# Patient Record
Sex: MALE | Race: WHITE | URBAN - METROPOLITAN AREA
[De-identification: names, ages, dates, MRNs, and addresses within clinical notes are randomized per-mention and may not be internally consistent; named-entity substitution may affect disease eponyms.]

---

## 2017-04-30 ENCOUNTER — HOSPITAL ENCOUNTER (INPATIENT)
Facility: CLINIC | Age: 57
LOS: 3 days | Discharge: HOME | DRG: 897 | End: 2017-05-03
Attending: FAMILY MEDICINE | Admitting: PSYCHIATRY & NEUROLOGY
Payer: COMMERCIAL

## 2017-04-30 DIAGNOSIS — F10.20 ALCOHOL USE DISORDER, SEVERE, DEPENDENCE (H): Primary | Chronic | ICD-10-CM

## 2017-04-30 DIAGNOSIS — F10.229 ALCOHOL DEPENDENCE WITH INTOXICATION WITH COMPLICATION (H): ICD-10-CM

## 2017-04-30 PROBLEM — F10.939 ALCOHOL WITHDRAWAL (H): Status: ACTIVE | Noted: 2017-04-30

## 2017-04-30 LAB
ALBUMIN SERPL-MCNC: 4.1 G/DL (ref 3.4–5)
ALCOHOL BREATH TEST: 0.22 (ref 0–0.01)
ALCOHOL BREATH TEST: 0.33 (ref 0–0.01)
ALP SERPL-CCNC: 58 U/L (ref 40–150)
ALT SERPL W P-5'-P-CCNC: 135 U/L (ref 0–70)
AMPHETAMINES UR QL SCN: ABNORMAL
ANION GAP SERPL CALCULATED.3IONS-SCNC: 10 MMOL/L (ref 3–14)
AST SERPL W P-5'-P-CCNC: 135 U/L (ref 0–45)
BARBITURATES UR QL: ABNORMAL
BASOPHILS # BLD AUTO: 0.1 10E9/L (ref 0–0.2)
BASOPHILS NFR BLD AUTO: 0.6 %
BENZODIAZ UR QL: ABNORMAL
BILIRUB SERPL-MCNC: 0.5 MG/DL (ref 0.2–1.3)
BUN SERPL-MCNC: 8 MG/DL (ref 7–30)
CALCIUM SERPL-MCNC: 8.9 MG/DL (ref 8.5–10.1)
CANNABINOIDS UR QL SCN: ABNORMAL
CHLORIDE SERPL-SCNC: 101 MMOL/L (ref 94–109)
CO2 SERPL-SCNC: 29 MMOL/L (ref 20–32)
COCAINE UR QL: ABNORMAL
CREAT SERPL-MCNC: 0.72 MG/DL (ref 0.66–1.25)
DIFFERENTIAL METHOD BLD: ABNORMAL
EOSINOPHIL # BLD AUTO: 0.1 10E9/L (ref 0–0.7)
EOSINOPHIL NFR BLD AUTO: 0.6 %
ERYTHROCYTE [DISTWIDTH] IN BLOOD BY AUTOMATED COUNT: 12.7 % (ref 10–15)
ETHANOL SERPL-MCNC: 0.33 G/DL
ETHANOL UR QL SCN: ABNORMAL
GFR SERPL CREATININE-BSD FRML MDRD: ABNORMAL ML/MIN/1.7M2
GGT SERPL-CCNC: 88 U/L (ref 0–75)
GLUCOSE SERPL-MCNC: 98 MG/DL (ref 70–99)
HCT VFR BLD AUTO: 52.7 % (ref 40–53)
HGB BLD-MCNC: 19.1 G/DL (ref 13.3–17.7)
IMM GRANULOCYTES # BLD: 0 10E9/L (ref 0–0.4)
IMM GRANULOCYTES NFR BLD: 0.3 %
LIPASE SERPL-CCNC: 303 U/L (ref 73–393)
LYMPHOCYTES # BLD AUTO: 3.3 10E9/L (ref 0.8–5.3)
LYMPHOCYTES NFR BLD AUTO: 36.1 %
MCH RBC QN AUTO: 33.7 PG (ref 26.5–33)
MCHC RBC AUTO-ENTMCNC: 36.2 G/DL (ref 31.5–36.5)
MCV RBC AUTO: 93 FL (ref 78–100)
MONOCYTES # BLD AUTO: 0.9 10E9/L (ref 0–1.3)
MONOCYTES NFR BLD AUTO: 10.3 %
NEUTROPHILS # BLD AUTO: 4.7 10E9/L (ref 1.6–8.3)
NEUTROPHILS NFR BLD AUTO: 52.1 %
NRBC # BLD AUTO: 0 10*3/UL
NRBC BLD AUTO-RTO: 0 /100
OPIATES UR QL SCN: ABNORMAL
PLATELET # BLD AUTO: 219 10E9/L (ref 150–450)
POTASSIUM SERPL-SCNC: 3.8 MMOL/L (ref 3.4–5.3)
PROT SERPL-MCNC: 8.1 G/DL (ref 6.8–8.8)
RBC # BLD AUTO: 5.67 10E12/L (ref 4.4–5.9)
SODIUM SERPL-SCNC: 140 MMOL/L (ref 133–144)
WBC # BLD AUTO: 9 10E9/L (ref 4–11)

## 2017-04-30 PROCEDURE — 36415 COLL VENOUS BLD VENIPUNCTURE: CPT

## 2017-04-30 PROCEDURE — 99285 EMERGENCY DEPT VISIT HI MDM: CPT

## 2017-04-30 PROCEDURE — 80320 DRUG SCREEN QUANTALCOHOLS: CPT | Performed by: EMERGENCY MEDICINE

## 2017-04-30 PROCEDURE — 12800008 ZZH R&B CD ADULT

## 2017-04-30 PROCEDURE — 82977 ASSAY OF GGT: CPT | Performed by: FAMILY MEDICINE

## 2017-04-30 PROCEDURE — 85025 COMPLETE CBC W/AUTO DIFF WBC: CPT | Performed by: FAMILY MEDICINE

## 2017-04-30 PROCEDURE — 99285 EMERGENCY DEPT VISIT HI MDM: CPT | Mod: Z6 | Performed by: FAMILY MEDICINE

## 2017-04-30 PROCEDURE — 82075 ASSAY OF BREATH ETHANOL: CPT | Mod: 91

## 2017-04-30 PROCEDURE — 80320 DRUG SCREEN QUANTALCOHOLS: CPT | Performed by: FAMILY MEDICINE

## 2017-04-30 PROCEDURE — HZ2ZZZZ DETOXIFICATION SERVICES FOR SUBSTANCE ABUSE TREATMENT: ICD-10-PCS | Performed by: PSYCHIATRY & NEUROLOGY

## 2017-04-30 PROCEDURE — 82075 ASSAY OF BREATH ETHANOL: CPT | Performed by: FAMILY MEDICINE

## 2017-04-30 PROCEDURE — 83690 ASSAY OF LIPASE: CPT | Performed by: FAMILY MEDICINE

## 2017-04-30 PROCEDURE — 25000132 ZZH RX MED GY IP 250 OP 250 PS 637: Performed by: FAMILY MEDICINE

## 2017-04-30 PROCEDURE — 82075 ASSAY OF BREATH ETHANOL: CPT | Mod: 91 | Performed by: FAMILY MEDICINE

## 2017-04-30 PROCEDURE — 80053 COMPREHEN METABOLIC PANEL: CPT | Performed by: FAMILY MEDICINE

## 2017-04-30 PROCEDURE — 82075 ASSAY OF BREATH ETHANOL: CPT

## 2017-04-30 PROCEDURE — 80307 DRUG TEST PRSMV CHEM ANLYZR: CPT | Performed by: EMERGENCY MEDICINE

## 2017-04-30 PROCEDURE — 25000132 ZZH RX MED GY IP 250 OP 250 PS 637: Performed by: PSYCHIATRY & NEUROLOGY

## 2017-04-30 RX ORDER — HYDROXYZINE HYDROCHLORIDE 25 MG/1
25-50 TABLET, FILM COATED ORAL EVERY 4 HOURS PRN
Status: DISCONTINUED | OUTPATIENT
Start: 2017-04-30 | End: 2017-05-03 | Stop reason: HOSPADM

## 2017-04-30 RX ORDER — DIAZEPAM 5 MG
5-20 TABLET ORAL EVERY 30 MIN PRN
Status: DISCONTINUED | OUTPATIENT
Start: 2017-04-30 | End: 2017-05-03 | Stop reason: HOSPADM

## 2017-04-30 RX ORDER — LISINOPRIL 20 MG/1
20 TABLET ORAL DAILY
Status: DISCONTINUED | OUTPATIENT
Start: 2017-05-01 | End: 2017-05-03 | Stop reason: HOSPADM

## 2017-04-30 RX ORDER — DIAZEPAM 5 MG
5 TABLET ORAL ONCE
Status: COMPLETED | OUTPATIENT
Start: 2017-04-30 | End: 2017-04-30

## 2017-04-30 RX ORDER — ALUMINA, MAGNESIA, AND SIMETHICONE 2400; 2400; 240 MG/30ML; MG/30ML; MG/30ML
30 SUSPENSION ORAL EVERY 4 HOURS PRN
Status: DISCONTINUED | OUTPATIENT
Start: 2017-04-30 | End: 2017-05-03 | Stop reason: HOSPADM

## 2017-04-30 RX ORDER — OMEGA-3-ACID ETHYL ESTERS 1 G/1
1 CAPSULE, LIQUID FILLED ORAL DAILY
COMMUNITY

## 2017-04-30 RX ORDER — LANOLIN ALCOHOL/MO/W.PET/CERES
100 CREAM (GRAM) TOPICAL DAILY
Status: DISCONTINUED | OUTPATIENT
Start: 2017-05-01 | End: 2017-05-03 | Stop reason: HOSPADM

## 2017-04-30 RX ORDER — MULTIPLE VITAMINS W/ MINERALS TAB 9MG-400MCG
1 TAB ORAL DAILY
Status: DISCONTINUED | OUTPATIENT
Start: 2017-05-01 | End: 2017-05-03 | Stop reason: HOSPADM

## 2017-04-30 RX ORDER — ACETAMINOPHEN 325 MG/1
650 TABLET ORAL EVERY 4 HOURS PRN
Status: DISCONTINUED | OUTPATIENT
Start: 2017-04-30 | End: 2017-05-03 | Stop reason: HOSPADM

## 2017-04-30 RX ORDER — OMEGA-3-ACID ETHYL ESTERS 1 G/1
1 CAPSULE, LIQUID FILLED ORAL DAILY
Status: DISCONTINUED | OUTPATIENT
Start: 2017-05-01 | End: 2017-05-03 | Stop reason: HOSPADM

## 2017-04-30 RX ORDER — NICOTINE 21 MG/24HR
1 PATCH, TRANSDERMAL 24 HOURS TRANSDERMAL DAILY
Status: DISCONTINUED | OUTPATIENT
Start: 2017-04-30 | End: 2017-05-03 | Stop reason: HOSPADM

## 2017-04-30 RX ORDER — BISACODYL 10 MG
10 SUPPOSITORY, RECTAL RECTAL DAILY PRN
Status: DISCONTINUED | OUTPATIENT
Start: 2017-04-30 | End: 2017-05-03 | Stop reason: HOSPADM

## 2017-04-30 RX ORDER — FOLIC ACID 1 MG/1
1 TABLET ORAL DAILY
Status: DISCONTINUED | OUTPATIENT
Start: 2017-05-01 | End: 2017-05-03 | Stop reason: HOSPADM

## 2017-04-30 RX ADMIN — DIAZEPAM 5 MG: 5 TABLET ORAL at 19:35

## 2017-04-30 RX ADMIN — DIAZEPAM 10 MG: 5 TABLET ORAL at 22:22

## 2017-04-30 ASSESSMENT — ACTIVITIES OF DAILY LIVING (ADL)
GROOMING: INDEPENDENT
ORAL_HYGIENE: INDEPENDENT
DRESS: INDEPENDENT

## 2017-04-30 NOTE — IP AVS SNAPSHOT
[facility]    [address] [place] [id number]    Phone:  [phone]                                       After Visit Summary   4/30/2017    Deric Greenwood    MRN: [id number]           After Visit Summary Signature Page     I have received my discharge instructions, and my questions have been answered. I have discussed any challenges I see with this plan with the nurse or doctor.    ..........................................................................................................................................  Patient/Patient Representative Signature      ..........................................................................................................................................  Patient Representative Print Name and Relationship to Patient    ..................................................               ................................................  Date                                            Time    ..........................................................................................................................................  Reviewed by Signature/Title    ...................................................              ..............................................  Date                                                            Time

## 2017-04-30 NOTE — IP AVS SNAPSHOT
MRN:[id number]                      After Visit Summary   4/30/2017    Deric Greenwood    MRN: [id number]           Thank you!     Thank you for choosing [facility] for your care. Our goal is always to provide you with excellent care.        Patient Information     Date Of Birth          1960        Designated Caregiver       Most Recent Value    Caregiver    Will someone help with your care after discharge? yes    Name of designated caregiver sister Nguyen    Phone number of caregiver in cell phone    Caregiver address na      About your hospital stay     You were admitted on:  April 30, 2017 You last received care in the: [facility]    You were discharged on:  May 3, 2017       Who to Call     For medical emergencies, please call 911.  For non-urgent questions about your medical care, please call your primary care provider or clinic, None          Attending Provider     Provider Ozzie Mcgill MD Emergency Medicine    Megha Nunes MD Pediatrics       Primary Care Provider    Physician No Ref-Primary       No address on file        Further instructions from your care team       Behavioral Discharge Planning and Instructions  THANK YOU FOR CHOOSING THE [address]  [phone]    Summary: You were admitted to [facility] on 4/30/17 for treatment and evaluation of  Alcohol use.  A medical exam was performed that included lab work. You have met with a  and opted to continue your treatment with [facility] treatment program in [place].  You are scheduled for an intake with them on May 15, 2017 @ noon.  Please take care and make your recovery a priority, Deric!     [facility] [place]  Evening Outpatient Treatment  [address]  [address]  [place] [address]  Phone: [phone]    Main Diagnosis: Per Dr. Elvia Nunes MD;  Principal Problem:  Alcohol  dependence with uncomplicated withdrawal (H), still in active detoxification status  Active Problems:  Alcohol use disorder, severe, dependence (H), initiate naltrexone tomorrow if patient still approves  Hypertension, on medication, stable       Major Treatments, Procedures and Findings:  You were detoxed from alcohol with the Modified Selective Severity Protocol using Valium. You have met with a  to develop a treatment plan for discharge.  You have had labs drawn and a copy of those labs will be sent home with you.  Please bring your lab results with to your follow up doctor appointment.    Symptoms to Report:  If you experience more anxiety, confusion, sleeplessness, deep sadness or thoughts of suicide, notify your treatment team or notify your primary care physician. IF ANY OF THE SYMPTOMS YOU ARE EXPERIENCING ARE A MEDICAL EMERGENCY CALL 911 IMMEDIATELY.     Lifestyle Adjustment: Health Action Plan:  1.Create a daily schedule  2. Eat Healthy  3. Plan Enjoyable Sober Activities  4. Use Problem Solving Skills and Deal with Issues as they Arise.   5. Be Physically Active  6. Take your medications as prescribed  7. Get enough restful sleep  8. Practice Relaxation  9. Spend time with Supportive People  10. No use of alcohol, illegal drugs or addictive medications other than what is currently prescribed.   11.AA, NA Sponsor are excellent resources for support    Keeping hands clean is one of the most important steps we can take to avoid getting sick and spreading germs to others.  Please wash your hands frequently.     Primary Provider:  Dr. Anastasia Jett  May 26, 2017 @ 8:00 am  [phone]  Please take this discharge folder with you to all your follow up appointments as it contains your lab results, diagnosis, medication list and discharge recommendations.         General Medication Instructions:   See your medication sheet(s) for instructions.   Take all medicines as directed.  Make no changes  "unless your doctor suggests them.   Go to all your doctor visits.  Be sure to have all your required lab tests. This way, your medicines can be refilled on time.  Do not use any drugs not prescribed by your provider.  AA/NA and Sponsors are excellent resources for support  Avoid alcohol.    Any follow up concerns:  Nursing questions call the [facility] [phone]  Medical Record call [phone]  Outpatient Behavioral Intake call [phone]  LP+ Wait List/Bed Availability call [phone] ([place])    The entire treatment team has appreciated the opportunity to work with you Deric.  We wish you the best in the future and with your lifelong recovery goals. Please bring this discharge folder with you to all follow up appointments.  It contains your lab results, diagnosis, medication list and discharge recommendations.    THANK YOU FOR CHOOSING THE [facility]     The treatment team has appreciated the opportunity to work with you.  We wish you the best in the future.      Pending Results     No orders found from 4/28/2017 to 5/1/2017.            Admission Information     Date & Time Provider Department Dept. Phone    4/30/2017 Megha Nunes MD [facility] [phone]      Your Vitals Were     Blood Pressure Pulse Temperature Respirations Weight Pulse Oximetry    150/97 (BP Location: Left arm) 93 98.2  F (36.8  C) (Oral) 16 90.7 kg (200 lb) 98%    BMI (Body Mass Index)                   26.39 kg/m2           MyChart Information     [employer] lets you send messages to your doctor, view your test results, renew your prescriptions, schedule appointments and more. To sign up, go to [email or web address] . Click on \"Log in\" on the left side of the screen, which will take you to the Welcome page. Then click on \"Sign up Now\" on the right side of the page.     You will be asked to enter the access code listed below, as well as some personal " information. Please follow the directions to create your username and password.     Your access code is: [email or web address]  Expires: 2017 11:35 AM     Your access code will  in 90 days. If you need help or a new code, please call your [facility] clinic or [phone].        Care EveryWhere ID     This is your Care EveryWhere ID. This could be used by other organizations to access your [facility] medical records  [id number]           Review of your medicines      START taking        Dose / Directions    naltrexone 50 MG tablet   Commonly known as:  DEPADE;REVIA   Used for:  Alcohol use disorder, severe, dependence (H)        Dose:  50 mg   Take 1 tablet (50 mg) by mouth daily   Quantity:  30 tablet   Refills:  0         CONTINUE these medicines which have NOT CHANGED        Dose / Directions    LISINOPRIL PO        Dose:  20 mg   Take 20 mg by mouth daily   Refills:  0       multivitamin, therapeutic with minerals Tabs tablet   Used for:  Uncomplicated alcohol dependence (H)        Dose:  1 tablet   Take 1 tablet by mouth daily   Quantity:  30 each   Refills:  0       omega-3 acid ethyl esters 1 G capsule   Commonly known as:  Lovaza        Dose:  1 g   Take 1 g by mouth daily   Refills:  0       TRAZODONE HCL PO   Indication:  Trouble Sleeping        Dose:  25 mg   Take 25 mg by mouth nightly as needed for sleep   Refills:  0            Where to get your medicines      These medications were sent to [facility] [address]  [address] [address]     Phone:  [phone]     naltrexone 50 MG tablet                Protect others around you: Learn how to safely use, store and throw away your medicines at www.disposemymeds.org.             Medication List: This is a list of all your medications and when to take them. Check marks below indicate your daily home schedule. Keep this list as a reference.      Medications           Morning Afternoon Evening  Bedtime As Needed    LISINOPRIL PO   Take 20 mg by mouth daily   Last time this was given:  20 mg on 5/3/2017  8:05 AM                                   multivitamin, therapeutic with minerals Tabs tablet   Take 1 tablet by mouth daily   Last time this was given:  1 tablet on 5/3/2017  8:05 AM                                   naltrexone 50 MG tablet   Commonly known as:  DEPADE;REVIA   Take 1 tablet (50 mg) by mouth daily   Last time this was given:  25 mg on 5/3/2017  8:06 AM                                   omega-3 acid ethyl esters 1 G capsule   Commonly known as:  Lovaza   Take 1 g by mouth daily   Last time this was given:  1 g on 5/3/2017  8:06 AM                                   TRAZODONE HCL PO   Take 25 mg by mouth nightly as needed for sleep                                             More Information        What Is High Blood Pressure?  High blood pressure (also called hypertension) is known as the  silent killer.  This is because most of the time it doesn t cause symptoms. In fact, many people don t know they have it until other problems develop. In most cases, high blood pressure can t be cured. It s a disease that requires lifelong treatment. The good news is that it CAN be managed.  Understanding blood pressure  The circulatory system is made up of the heart and blood vessels that carry blood through the body. Your heart is the pump for this system. With each heartbeat (contraction), the heart sends blood out through large blood vessels called arteries. Blood pressure is a measure of how hard the moving blood pushes against the walls of the arteries.          High blood pressure can harm your health  High blood pressure makes the heart work harder to pump blood. Frequent high blood pressure can also cause changes in the artery walls. The walls thicken and become rough, which leads to a buildup of plaque (a fatty material). This can damage the arteries. It can also reduce blood flow through the  artery. If blood pressure is not controlled, all these effects can lead to serious health problems. These include heart disease, heart attack (also known as acute myocardial infarction, or AMI), stroke, kidney disease, and blindness.  Measuring blood pressure  An example of a blood pressure measurement is 120/70 (120 over 70). The top number is the pressure of blood against the artery walls during a heartbeat (systolic). The bottom number is the pressure of blood against artery walls between heartbeats (diastolic). Talk with your health care provider to find out what your blood pressure goals should be.   Controlling blood pressure  If your blood pressure is too high, work with your doctor on a plan for lowering it. Below are steps you can take that will help lower your blood pressure.    Choose heart-healthy foods. Eating healthier meals helps you control your blood pressure. Ask your doctor about the DASH eating plan. This plan helps reduce blood pressure by limiting the amount of sodium (salt) you have in your diet.     Maintain a healthy weight. Being overweight makes you more likely to have high blood pressure. Losing excess weight helps lower blood pressure.    Exercise regularly. Daily exercise helps your heart and blood vessels work better and stay healthier. It can help lower your blood pressure.    Stop smoking. Smoking increases blood pressure and damages blood vessels.    Limit alcohol. Drinking too much alcohol can raise blood pressure. Men should have no more than 2 drinks a day. Women should have no more than 1. (A drink is equal to 1 beer, or a small glass of wine, or a shot of liquor.)    Control stress. Stress makes your heart work harder and beat faster. Controlling stress helps you control your blood pressure.  Facts about high blood pressure    Feeling OK does not mean that blood pressure is under control. Likewise, feeling bad doesn t mean it s out of control. The only way to know for sure is  to check your pressure regularly.    Medication is only one part of controlling high blood pressure. You also need to manage your weight, get regular exercise, and adjust your eating habits.    High blood pressure is usually a lifelong problem. But it can be controlled with healthy lifestyle changes and medication.    Hypertension is not the same as stress. Although stress may be a factor in high blood pressure, it s only one part of the story.    Blood pressure medications need to be taken every day. Stopping suddenly may cause a dangerous increase in pressure.       2475-7491 [employer]. [address] [place] [address], [place] [address]. All rights reserved. This information is not intended as a substitute for professional medical care. Always follow your healthcare professional's instructions.

## 2017-04-30 NOTE — ED PROVIDER NOTES
History     Chief Complaint   Patient presents with     Addiction Problem     seeking detox from alcohol. 2 bottles wine/daily. last drink 1500 approx. Has bed reserved on 3A     Alcohol Intoxication     HPI  Deric Greenwood is a 56 year old male who presents seeking detox from alcohol. The patient reports that he has been drinking alcohol daily over the past 2-3 months. He reports that he has been drinking 2 bottles of wine daily and recently drinking some vodka as well. The patient reports that he has been through detox 3 times in the past, most recently in 10/2016, and had been sober for about 3 months following this. He has never been through CD treatment. He denies any depression or suicidal ideation. He states that he has been drinking fluids, but has not eaten in the past 24 hours. He denies any history of withdrawal seizures. He does have a history of hypertension for which he is on Lisinopril 20 mg. He denies any physical complaints currently.      Past Medical History:   Diagnosis Date     Hypertension      Substance abuse        History reviewed. No pertinent surgical history.    No family history on file.    Social History   Substance Use Topics     Smoking status: Current Every Day Smoker     Packs/day: 1.00     Smokeless tobacco: Not on file     Alcohol use Yes      Comment: 2 bottles wine/daily     No current facility-administered medications for this encounter.      Current Outpatient Prescriptions   Medication     multivitamin, therapeutic with minerals (THERA-VIT-M) TABS     LISINOPRIL PO     TRAZODONE HCL PO     Thiamine HCl (VITAMIN  B-1) 250 MG TABS     disulfiram (ANTABUSE) 250 MG tablet      No Known Allergies     I have reviewed the Medications, Allergies, Past Medical and Surgical History, and Social History in the Epic system.    Review of Systems   Psychiatric/Behavioral: Negative for suicidal ideas.   All other systems reviewed and are negative.      Physical Exam   BP:  134/78  Pulse: 98  Temp: 96.8  F (36  C)  Resp: 16  SpO2: 96 %  Physical Exam   Constitutional: He is oriented to person, place, and time. No distress.   HENT:   Head: Atraumatic.   Mouth/Throat: Oropharynx is clear and moist. No oropharyngeal exudate.   Eyes: Pupils are equal, round, and reactive to light. No scleral icterus.   Cardiovascular: Normal heart sounds and intact distal pulses.    Pulmonary/Chest: Breath sounds normal. No respiratory distress.   Abdominal: Soft. Bowel sounds are normal. There is no tenderness.   Musculoskeletal: He exhibits no edema or tenderness.   Neurological: He is alert and oriented to person, place, and time. He has normal reflexes. He displays normal reflexes. No cranial nerve deficit. He exhibits normal muscle tone. Coordination normal.   Skin: Skin is warm. No rash noted. He is not diaphoretic.   Psychiatric: His mood appears anxious. He expresses no suicidal ideation.       ED Course     ED Course     Procedures     5:54 PM  The patient was seen and examined by Dr. Jesus in Room HW3.           Critical Care time:  none      Labs Ordered and Resulted from Time of ED Arrival Up to the Time of Departure from the ED   CBC WITH PLATELETS DIFFERENTIAL - Abnormal; Notable for the following:        Result Value    Hemoglobin 19.1 (*)     MCH 33.7 (*)     All other components within normal limits   COMPREHENSIVE METABOLIC PANEL - Abnormal; Notable for the following:      (*)      (*)     All other components within normal limits   ALCOHOL BREATH TEST POCT - Abnormal; Notable for the following:     Alcohol Breath Test 0.33 (*)     All other components within normal limits   ALCOHOL ETHYL   LIPASE   DRUG ABUSE SCREEN 6 CHEM DEP URINE ([facility])       Patient began to have more withdrawal symptoms and was given 5 mg of Valium by mouth.    Assessments & Plan (with Medical Decision Making)     I have reviewed the nursing notes.    I have reviewed the findings, diagnosis,  plan and need for follow up with the patient.  Patient is currently on-call dependence at this time requesting detox and possibly treatment patient will be admitted to station 3A with alcohol dependence and early withdrawal.    New Prescriptions    No medications on file       Final diagnoses:   Alcohol dependence with intoxication with complication (H)     I, Willie West, am serving as a trained medical scribe to document services personally performed by Ozzie Jesus MD, based on the provider's statements to me.   IOzzie MD, was physically present and have reviewed and verified the accuracy of this note documented by Willie West.     4/30/2017   [facility], [place], EMERGENCY DEPARTMENT     Ozzie Jesus MD  04/30/17 1912       Ozzie Jesus MD  04/30/17 2041

## 2017-05-01 PROBLEM — F10.230 ALCOHOL DEPENDENCE WITH UNCOMPLICATED WITHDRAWAL (H): Status: ACTIVE | Noted: 2017-04-30

## 2017-05-01 PROCEDURE — 12800008 ZZH R&B CD ADULT

## 2017-05-01 PROCEDURE — 99223 1ST HOSP IP/OBS HIGH 75: CPT | Mod: AI | Performed by: PSYCHIATRY & NEUROLOGY

## 2017-05-01 PROCEDURE — 25000132 ZZH RX MED GY IP 250 OP 250 PS 637: Performed by: PSYCHIATRY & NEUROLOGY

## 2017-05-01 RX ADMIN — DIAZEPAM 10 MG: 5 TABLET ORAL at 20:30

## 2017-05-01 RX ADMIN — DIAZEPAM 10 MG: 5 TABLET ORAL at 00:21

## 2017-05-01 RX ADMIN — LISINOPRIL 20 MG: 20 TABLET ORAL at 08:21

## 2017-05-01 RX ADMIN — Medication 100 MG: at 08:21

## 2017-05-01 RX ADMIN — NICOTINE 1 PATCH: 21 PATCH, EXTENDED RELEASE TRANSDERMAL at 08:20

## 2017-05-01 RX ADMIN — MULTIPLE VITAMINS W/ MINERALS TAB 1 TABLET: TAB at 08:21

## 2017-05-01 RX ADMIN — DIAZEPAM 10 MG: 5 TABLET ORAL at 08:21

## 2017-05-01 RX ADMIN — DIAZEPAM 10 MG: 5 TABLET ORAL at 03:51

## 2017-05-01 RX ADMIN — OMEGA-3-ACID ETHYL ESTERS 1 G: 900 CAPSULE, LIQUID FILLED ORAL at 08:21

## 2017-05-01 RX ADMIN — DIAZEPAM 10 MG: 5 TABLET ORAL at 16:35

## 2017-05-01 RX ADMIN — DIAZEPAM 10 MG: 5 TABLET ORAL at 10:56

## 2017-05-01 RX ADMIN — DIAZEPAM 10 MG: 5 TABLET ORAL at 13:07

## 2017-05-01 RX ADMIN — FOLIC ACID 1 MG: 1 TABLET ORAL at 08:21

## 2017-05-01 ASSESSMENT — ACTIVITIES OF DAILY LIVING (ADL)
GROOMING: INDEPENDENT
DRESS: INDEPENDENT
ORAL_HYGIENE: INDEPENDENT

## 2017-05-01 NOTE — PROGRESS NOTES
04/30/17 2208   Patient Belongings   Patient Belongings other (see comments)   Disposition of Belongings STORAGE AREA   Belongings Search Yes   Clothing Search Yes   Second Staff ANA   General Info Comment SEE NOTES     STORAGE BIN:  Tote bag, toiletries bag, laptop, jacket, belt,ciggarettes, car key lighter    LOCKED DRAWER:  Cell Phone, , Reading glasses      Medication/Nicotine ENV # [id number]    Env # [id number]  $293.00 Cash,3 Visa Cards, 1 [employer] card, 1 [place]  License, 1 Dental ins card, 1 Health Ins card    ADMISSION:  I am responsible for any personal items that are not sent to the safe or pharmacy. [facility] is not responsible for loss, theft or damage of any property in my possession.    Patient Signature _____________________ Date/Time _____________________    Staff Signature _______________________ Date/Time _____________________    2nd Staff person, if patient is unable/unwilling to sign  ___________________________________ Date/Time _____________________    DISCHARGE:  My personal items have been returned to me.   Patient Signature _____________________ Date/Time _____________________

## 2017-05-01 NOTE — PLAN OF CARE
Problem: Substance Withdrawal  Goal: Substance Withdrawal  Signs and symptoms of listed problems will be absent or manageable.   Outcome: No Change  Deric was out of his room most of the shift. He did some minor pacing early in the morning but after getting two doses of valium he stated he was feeling better and was able to relax and sit down for longer stretches of time. Polite, cooperative. He completed his paperwork but is still uncertain if he wants treatment or not after detox.

## 2017-05-01 NOTE — H&P
Addiction Medicine History and Physical    Deric Greenwood MRN# [id number]   Age: 56 year old YOB: 1960     Date of Admission:  4/30/2017  Date of H&P:   May 1, 2017    Primary care provider: [facility]          Assessment and Plan:   Assessment:   Principal Problem:    Alcohol dependence with uncomplicated withdrawal (H)  Active Problems:    Alcohol use disorder, severe, dependence (H)    Hypertension - on medication      Plan:   Admit to 3A  Monitor and manage alcohol withdrawal via MSSA with diazepam  Initiate naltrexone when OOD, if patient approves  Dispo planning ongoing; see CM notes for details, but patient open to outpatient resources          Chief Complaint:   Alcohol withdrawal     History is obtained from the patient, and chart review          History of Present Illness:   This patient is a 56 year old single/, employed male living alone, with chronic relapsing AUD course, who presented to our ED seeking detoxification services. Known to our unit from previous detoxification episodes, not followed by treatment. Patient works full time in tech industry, with at least monthly travel. Last detoxification episode was October 2016 under Dr. Galdamez. He was offered but declined treatment. He was sober for about 3 months. He notes that there is a lot of alcohol during work trips that he has no interest in, but manager had alcohol in office for recent celebration and that started this most recent relapse. He has been relapsed two daily drinking, two bottles of wine plus liquor, for past 2 months. He denies intercurrent injury or illness. He denies mood or anxiety complaints. He is open to at least outpatient treatment discussion. We reviewed proposed risks, benefits, side effects of naltrexone, and he is open to trying this when OOD. He is admitted voluntarily. Current withdrawal complaints are tremulousness, flushed, sweats, anxiety.            Past Medical History:     I have  reviewed this patient's past medical history  Past Medical History:   Diagnosis Date     Hypertension            Past Surgical History:     History reviewed. No pertinent surgical history. Patient denies.         Social History:   I have reviewed this patient's social history, see HPI. Three children, 17 to 27 years.         Family History:   I have reviewed this patient's family history, and it is no directly pertinent to present treatment encounter.          Immunizations:   There is no immunization history for the selected administration types on file for this patient.          Allergies:   All allergies reviewed and addressed  No Known Allergies          Medications:     Prescriptions Prior to Admission   Medication Sig Dispense Refill Last Dose     omega-3 acid ethyl esters (LOVAZA) 1 G capsule Take 1 g by mouth daily   4/30/2017     multivitamin, therapeutic with minerals (THERA-VIT-M) TABS Take 1 tablet by mouth daily 30 each 0 Past Week     LISINOPRIL PO Take 20 mg by mouth daily   4/30/2017     TRAZODONE HCL PO Take 25 mg by mouth nightly as needed for sleep   4/29/2017      [place]  query result:  Indicates no record, no prescriptions reported.         Review of Systems:   Complete ROS performed and is negative except as noted in HPI, and elsewhere in this note.           Physical Exam:     Vitals were reviewed  Patient Vitals for the past 12 hrs:   BP Temp Temp src Pulse Resp SpO2   05/01/17 0734 (!) 165/97 98.5  F (36.9  C) Oral 113 16 -   05/01/17 0343 143/86 99.5  F (37.5  C) Oral 107 16 -   05/01/17 0002 118/86 99  F (37.2  C) Oral 100 16 -   04/30/17 2135 140/78 100  F (37.8  C) Oral 112 - -   04/30/17 2104 127/74 98.3  F (36.8  C) Oral 106 - 98 %     Constitutional:   Alert, non-toxic, in moderate withdrawal distress     Eyes:   Anicteric, mild injection, PERRL no nystagmus     ENT:   No mucosal bleeding, no rhinorrhea, mmm     Lungs:   no increased work of breathing and clear to auscultation  "    Cardiovascular:   Well-perfused, no murmur     Abdomen:   Non-distended, active bowel sounds, soft, NT, no HSM     Neurologic:   Mild tremor, normal tone, gait intact, FTN without dysmetria     Skin:   Flushed, diaphoretic, no jaundice, no bleeding stigmata     MENTAL STATUS EXAM  Appearance: causal, appropriate, fair grooming  Attitude: engageable, cooperative  Behavior: no agitation or slowing  Eye Contact: focused on examiner, appropriate  Speech: coherent, no pressuring  Orientation: oriented to person , place, time and situation  Mood:  \"a little nervous\"  Affect: mildly anxious, otherwise calm, full range  Thought Process: linear, goal-directed  Suicidal Ideation: denies thought/intent/plan  Hallucination: denies  Insight: partial, capable of improvement  Judgment: adequate for safety         Data:   All laboratory data reviewed  Results for orders placed or performed during the hospital encounter of 04/30/17   Drug abuse screen 6 urine (tox)   Result Value Ref Range    Amphetamine Qual Urine  NEG     Negative   Cutoff for a negative amphetamine is 500 ng/mL or less.      Barbiturates Qual Urine  NEG     Negative   Cutoff for a negative barbiturate is 200 ng/mL or less.      Benzodiazepine Qual Urine  NEG     Negative   Cutoff for a negative benzodiazepine is 200 ng/mL or less.      Cannabinoids Qual Urine  NEG     Negative   Cutoff for a negative cannabinoid is 50 ng/mL or less.      Cocaine Qual Urine  NEG     Negative   Cutoff for a negative cocaine is 300 ng/mL or less.      Ethanol Qual Urine (A) NEG     Positive   Cutoff for a positive urine ethanol is greater than 0.05 g/dL.  This is an   unconfirmed screening result to be used for medical purposes only.      Opiates Qualitative Urine  NEG     Negative   Cutoff for a negative opiate is 300 ng/mL or less.     Alcohol level blood   Result Value Ref Range    Ethanol g/dL 0.33 (HH) <0.01 g/dL   CBC with platelets differential   Result Value Ref Range    " WBC 9.0 4.0 - 11.0 10e9/L    RBC Count 5.67 4.4 - 5.9 10e12/L    Hemoglobin 19.1 (H) 13.3 - 17.7 g/dL    Hematocrit 52.7 40.0 - 53.0 %    MCV 93 78 - 100 fl    MCH 33.7 (H) 26.5 - 33.0 pg    MCHC 36.2 31.5 - 36.5 g/dL    RDW 12.7 10.0 - 15.0 %    Platelet Count 219 150 - 450 10e9/L    Diff Method Automated Method     % Neutrophils 52.1 %    % Lymphocytes 36.1 %    % Monocytes 10.3 %    % Eosinophils 0.6 %    % Basophils 0.6 %    % Immature Granulocytes 0.3 %    Nucleated RBCs 0 0 /100    Absolute Neutrophil 4.7 1.6 - 8.3 10e9/L    Absolute Lymphocytes 3.3 0.8 - 5.3 10e9/L    Absolute Monocytes 0.9 0.0 - 1.3 10e9/L    Absolute Eosinophils 0.1 0.0 - 0.7 10e9/L    Absolute Basophils 0.1 0.0 - 0.2 10e9/L    Abs Immature Granulocytes 0.0 0 - 0.4 10e9/L    Absolute Nucleated RBC 0.0    Comprehensive metabolic panel   Result Value Ref Range    Sodium 140 133 - 144 mmol/L    Potassium 3.8 3.4 - 5.3 mmol/L    Chloride 101 94 - 109 mmol/L    Carbon Dioxide 29 20 - 32 mmol/L    Anion Gap 10 3 - 14 mmol/L    Glucose 98 70 - 99 mg/dL    Urea Nitrogen 8 7 - 30 mg/dL    Creatinine 0.72 0.66 - 1.25 mg/dL    GFR Estimate >90  Non  GFR Calc   >60 mL/min/1.7m2    GFR Estimate If Black >90   GFR Calc   >60 mL/min/1.7m2    Calcium 8.9 8.5 - 10.1 mg/dL    Bilirubin Total 0.5 0.2 - 1.3 mg/dL    Albumin 4.1 3.4 - 5.0 g/dL    Protein Total 8.1 6.8 - 8.8 g/dL    Alkaline Phosphatase 58 40 - 150 U/L     (H) 0 - 70 U/L     (H) 0 - 45 U/L   Lipase   Result Value Ref Range    Lipase 303 73 - 393 U/L   GGT   Result Value Ref Range    GGT 88 (H) 0 - 75 U/L   Alcohol breath test POCT   Result Value Ref Range    Alcohol Breath Test 0.33 (A) 0.00 - 0.01   Alcohol breath test POCT   Result Value Ref Range    Alcohol Breath Test 0.217 (A) 0.00 - 0.01      Attestation:  I have reviewed today's vital signs, notes, medications, and labs/studies pertinent to this encounter.    Indication for hospitalization is  severe alcohol use disorder with physical dependence and withdrawal; high degree of complexity due to potential withdrawal morbidity, complicated by hypertension, chronic relapsing course.    Megha Nunes MD   Pediatrics/Addiction Medicine

## 2017-05-01 NOTE — PROGRESS NOTES
Met with Pt to initiate discharge planning.  Pt uncertain of what he wants to do moving forward but willing to participate in assessment.  Coached Pt to complete paperwork.  Pt acknowledged.

## 2017-05-01 NOTE — PLAN OF CARE
Problem: General Plan of Care (Inpatient Behavioral)  Goal: Individualization/Patient Specific Goal (IP Behavioral)  The patient and/or their representative will achieve their patient-specific goals related to the plan of care.    The patient-specific goals include:   Patient will identify reason(s) for hospitalization from their perspective.  Patient will identify a goal for discharge.  Patient will identify triggers that may increase their risk for relapse.  Patient will identify coping skills they can do to stay well.   Patient will identify their support system to demonstrate readiness for discharge.    Illnesses Management & Recovery  Patient identified group drinking at work, boredom as trigger for relapse.  Patient identified   Take medications daily as a general wellness strategy.  Patient identified peer pressure, feeling tense or nervous  as a warning sign that they are in danger of relapse.  Patient identified  Friends deb, frances and adult children as someone they cont on to get feedback from.  Patient identified  Going to outpatient program as a way to take action when in danger of relapse.  Patient identified  Walk, bike  as a way to cope with stress or other symptoms.

## 2017-05-01 NOTE — PHARMACY-ADMISSION MEDICATION HISTORY
Admission Medication History status for the 4/30/2017 admission is complete.  See EPIC admission navigator for Prior to Admission medications.    Medication history sources:  Patient    Medication history source reliability: Good; patient knew his medications and doses    Medication adherence:  Good; patient reports he takes his medications regularly    Changes made to PTA medication list (reason)  Added:   - Omega 3 fish oil 1 g po daily per patient  Deleted:   - Disulfiram; patient reports he is no longer taking  - Thiamine; patient reports he is no longer taking  Changed: None    Additional medication history information (including reliability of information, actions taken by pharmacist): None    Time spent in this activity: 15 minutes    Medication history completed by: Kenia Pierce PharmD    Prior to Admission medications    Medication Sig Last Dose Taking? Auth Provider   omega-3 acid ethyl esters (LOVAZA) 1 G capsule Take 1 g by mouth daily 4/30/2017 Yes Unknown, Entered By History   multivitamin, therapeutic with minerals (THERA-VIT-M) TABS Take 1 tablet by mouth daily Past Week Yes Crow Galdamez MD   LISINOPRIL PO Take 20 mg by mouth daily 4/30/2017 Yes Reported, Patient   TRAZODONE HCL PO Take 25 mg by mouth nightly as needed for sleep 4/29/2017 Yes Reported, Patient

## 2017-05-02 PROBLEM — I10 HYPERTENSION: Status: ACTIVE | Noted: 2017-05-02

## 2017-05-02 PROCEDURE — 12800008 ZZH R&B CD ADULT

## 2017-05-02 PROCEDURE — 25000132 ZZH RX MED GY IP 250 OP 250 PS 637: Performed by: PSYCHIATRY & NEUROLOGY

## 2017-05-02 PROCEDURE — 99231 SBSQ HOSP IP/OBS SF/LOW 25: CPT | Performed by: PSYCHIATRY & NEUROLOGY

## 2017-05-02 RX ORDER — NALTREXONE HYDROCHLORIDE 50 MG/1
50 TABLET, FILM COATED ORAL DAILY
Qty: 30 TABLET | Refills: 0 | Status: SHIPPED | OUTPATIENT
Start: 2017-05-03

## 2017-05-02 RX ADMIN — LISINOPRIL 20 MG: 20 TABLET ORAL at 09:04

## 2017-05-02 RX ADMIN — DIAZEPAM 10 MG: 5 TABLET ORAL at 00:02

## 2017-05-02 RX ADMIN — NICOTINE 1 PATCH: 21 PATCH, EXTENDED RELEASE TRANSDERMAL at 09:03

## 2017-05-02 RX ADMIN — OMEGA-3-ACID ETHYL ESTERS 1 G: 900 CAPSULE, LIQUID FILLED ORAL at 09:04

## 2017-05-02 RX ADMIN — DIAZEPAM 10 MG: 5 TABLET ORAL at 12:24

## 2017-05-02 RX ADMIN — FOLIC ACID 1 MG: 1 TABLET ORAL at 09:05

## 2017-05-02 RX ADMIN — Medication 100 MG: at 09:04

## 2017-05-02 RX ADMIN — MULTIPLE VITAMINS W/ MINERALS TAB 1 TABLET: TAB at 09:05

## 2017-05-02 RX ADMIN — DIAZEPAM 10 MG: 5 TABLET ORAL at 04:28

## 2017-05-02 ASSESSMENT — ACTIVITIES OF DAILY LIVING (ADL)
LAUNDRY: WITH SUPERVISION
GROOMING: INDEPENDENT
ORAL_HYGIENE: INDEPENDENT
DRESS: INDEPENDENT

## 2017-05-02 NOTE — PLAN OF CARE
Problem: Substance Withdrawal  Goal: Substance Withdrawal  Signs and symptoms of listed problems will be absent or manageable.1. Detoxification from Alcohol using the [facility] valium protocol  2. Patient will complete assessment paperwork  3. Patient will meet with  to discuss treatment and discharge planning  4. Physical examination and Lab evaluation by MD  5. Patients oral intake will be greater than 75 % of meals to meet estimated needs  6. Adequate fluid intake  Outcome: Improving  Pt being monitored for alcohol withdrawal. Pt medicated x 1 with valium 10 mg at noon. BP elevated at noon 170/100. Did come down to 153/92 after receiving valium. Pt received a total of 70 mg of valium on 5-1-2017. Pt focused on being discharged in time to attend his adult son's graduation from college this weekend. Pt to start on naltrexone tomorrow. Pt state he also plans to attend outpt cd treatment. He also plans to attend smart recovery and AA meetings. 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

## 2017-05-02 NOTE — PROGRESS NOTES
Met with Pt for discharge planning.  Pt is willing to pursue outpatient treatment.  Provided number for [facility] program.  Pt called and is scheduled for intake on May 15th at noon.  Pt's insurance requires program complete a face to face interview.  Pt will be assessed at the time of his intake.

## 2017-05-02 NOTE — PROGRESS NOTES
Addiction Medicine Progress Note          Impression and Plan:   Impression:   Principal Problem:    Alcohol dependence with uncomplicated withdrawal (H), still in active detoxification status  Active Problems:    Alcohol use disorder, severe, dependence (H), initiate naltrexone tomorrow if patient still approves    Hypertension, on medication, stable      Plan:   Continue to monitor and manage alcohol withdrawal via MSSA with diazepam  Initiate naltrexone when OOD if patient still approves  Dispo planning ongoing; see CM notes for details, but recommending at least OP treatment, patient remains voluntary and can discharge if requested when OOD            Interval History:   No acute events. Still requiring diazepam through this morning, but states he feels much better. Appetite a little improved. Still interested in naltrexone. Ambivalent about treatment.            Review of Systems:   Complete ROS performed and is negative except as noted in interval history, and elsewhere in this note.           Medications:     Current Facility-Administered Medications   Medication     naltrexone (DEPADE;REVIA) tablet 25 mg     influenza quadrivalent (PF) vacc age 3 yrs and older (FLUZONE or Flulaval) injection 0.5 mL     pneumococcal vaccine (PNEUMOVAX 23-eric) injection 0.5 mL     lisinopril (PRINIVIL/ZESTRIL) tablet 20 mg     multivitamin, therapeutic with minerals (THERA-VIT-M) tablet 1 tablet     omega-3 acid ethyl esters (Lovaza) capsule 1 g     traZODone (DESYREL) half-tab 25 mg     hydrOXYzine (ATARAX) tablet 25-50 mg     diazepam (VALIUM) tablet 5-20 mg     acetaminophen (TYLENOL) tablet 650 mg     alum & mag hydroxide-simethicone (MYLANTA ES/MAALOX  ES) suspension 30 mL     magnesium hydroxide (MILK OF MAGNESIA) suspension 30 mL     bisacodyl (DULCOLAX) Suppository 10 mg     nicotine Patch in Place     nicotine patch REMOVAL     nicotine (NICODERM CQ) 21 MG/24HR 24 hr patch 1 patch     folic acid (FOLVITE) tablet 1 mg  "    thiamine tablet 100 mg          Physical Exam:     Vitals were reviewed  Patient Vitals for the past 12 hrs:   BP Temp Temp src Pulse Resp   05/02/17 0422 142/87 97.7  F (36.5  C) Oral 92 18   05/01/17 2355 135/75 97.3  F (36.3  C) Oral 74 16   05/01/17 2004 148/82 97.8  F (36.6  C) Tympanic 98 16     Constitutional:   Alert, non-toxic, in moderate withdrawal distress     Eyes:   Anicteric, mild injection, PERRL, no nystagmus     ENT:   Lips dry, mmm     Lungs:   no increased work of breathing and clear to auscultation     Cardiovascular:   Well-perfused, regular, no murmur     Neurologic:   Mild tremor, normal tone, gait intact, FTN without dysmetria     Skin:   Flushed, no jaundice     MENTAL STATUS EXAM  Appearance: casual, fair grooming  Attitude: engageable, cooperative  Behavior: no agitation or slowing   Eye Contact: normal  Speech: coherent, no pressuring  Orientation: oriented to person , place, time and situation  Mood:  \"okay\"  Affect: mildly anxious, full range  Thought Process: linear, goal-directed, no FOI or DORON  Suicidal Ideation: denies thought/intent/plan  Hallucination: denies  Insight: partial, capable of improvement  Judgment: adequate for safety          Data:   All laboratory data reviewed, no new interval data.    Attestation:  I have reviewed today's vital signs, notes, medications, and labs studies pertinent to this encounter.     Megha Nunes MD  Pediatrics/Addiction Medicine  "

## 2017-05-03 VITALS
HEART RATE: 93 BPM | SYSTOLIC BLOOD PRESSURE: 150 MMHG | RESPIRATION RATE: 16 BRPM | BODY MASS INDEX: 26.39 KG/M2 | TEMPERATURE: 98.2 F | OXYGEN SATURATION: 98 % | DIASTOLIC BLOOD PRESSURE: 97 MMHG | WEIGHT: 200 LBS

## 2017-05-03 PROCEDURE — 25000132 ZZH RX MED GY IP 250 OP 250 PS 637: Performed by: PSYCHIATRY & NEUROLOGY

## 2017-05-03 RX ADMIN — MULTIPLE VITAMINS W/ MINERALS TAB 1 TABLET: TAB at 08:05

## 2017-05-03 RX ADMIN — OMEGA-3-ACID ETHYL ESTERS 1 G: 900 CAPSULE, LIQUID FILLED ORAL at 08:06

## 2017-05-03 RX ADMIN — LISINOPRIL 20 MG: 20 TABLET ORAL at 08:05

## 2017-05-03 RX ADMIN — Medication 100 MG: at 08:05

## 2017-05-03 RX ADMIN — Medication 25 MG: at 08:06

## 2017-05-03 RX ADMIN — FOLIC ACID 1 MG: 1 TABLET ORAL at 08:05

## 2017-05-03 RX ADMIN — NICOTINE 1 PATCH: 21 PATCH, EXTENDED RELEASE TRANSDERMAL at 08:05

## 2017-05-03 ASSESSMENT — ACTIVITIES OF DAILY LIVING (ADL)
LAUNDRY: WITH SUPERVISION
GROOMING: INDEPENDENT
DRESS: INDEPENDENT
ORAL_HYGIENE: INDEPENDENT

## 2017-05-03 NOTE — PLAN OF CARE
"Problem: Discharge Planning  Goal: Discharge Planning (Adult, OB, Behavioral, Peds)  Outcome: Adequate for Discharge Date Met:  05/03/17  Pt's alcohol withdrawal is complete. Pt out on unit. Anxious to be discharged. BP slightly elevated. Handout on HTN and discussed management of HTN. PT is on BP medications. Pt has supply of all medications at home. Naltrexone started and will be sent for discharge. Pt has meetings he plans to attend and is scheduled to start a outpatient program. Pt reports mood as \"good, excited\". Denies SI. States he has been sleeping well even without taking trazodone. See discharge instructions. Text page to Dr. Nunes regarding discharge.       "

## 2017-05-03 NOTE — PROGRESS NOTES
"SPIRITUAL HEALTH SERVICES Progress Note  [facility] ([facility]) 3AW       DATA:    Initial visit; Deric and I recognized one another from an earlier hospitalization he had on 3AW.  After a brief check-in/update, Deric mentioned that he plans to discharge in the next 24-hours.  We spoke briefly about the opportunity/gift each treatment presents; the chance to learn more about oneself.       INTERVENTION:    Brief conversation.        OUTCOME:    Deric expressed that he was \"... in a good place ... ready to try [sobriety] again.\"       PLAN:    [facility] ([facility]) remains available to Deric.                                                                                                                                               Janny Williamson  Volunteer    Pager [phone]    "

## 2017-05-03 NOTE — DISCHARGE INSTRUCTIONS
Behavioral Discharge Planning and Instructions  THANK YOU FOR CHOOSING THE [facility]  3A  [phone]    Summary: You were admitted to Station 3A on 4/30/17 for treatment and evaluation of  Alcohol use.  A medical exam was performed that included lab work. You have met with a  and opted to continue your treatment with [facility] treatment program in [place].  You are scheduled for an intake with them on May 15, 2017 @ noon.  Please take care and make your recovery a priority, Deric!     [facility] [place]  Evening Outpatient Treatment  [address]  [address]  [place] [address]  Phone: [phone]    Main Diagnosis: Per Dr. Elvia Nunes MD;  Principal Problem:  Alcohol dependence with uncomplicated withdrawal (H), still in active detoxification status  Active Problems:  Alcohol use disorder, severe, dependence (H), initiate naltrexone tomorrow if patient still approves  Hypertension, on medication, stable       Major Treatments, Procedures and Findings:  You were detoxed from alcohol with the [facility] Protocol using Valium. You have met with a  to develop a treatment plan for discharge.  You have had labs drawn and a copy of those labs will be sent home with you.  Please bring your lab results with to your follow up doctor appointment.    Symptoms to Report:  If you experience more anxiety, confusion, sleeplessness, deep sadness or thoughts of suicide, notify your treatment team or notify your primary care physician. IF ANY OF THE SYMPTOMS YOU ARE EXPERIENCING ARE A MEDICAL EMERGENCY CALL 911 IMMEDIATELY.     Lifestyle Adjustment: Health Action Plan:  1.Create a daily schedule  2. Eat Healthy  3. Plan Enjoyable Sober Activities  4. Use Problem Solving Skills and Deal with Issues as they Arise.   5. Be Physically Active  6. Take your medications as prescribed  7. Get enough restful sleep  8. Practice  Relaxation  9. Spend time with Supportive People  10. No use of alcohol, illegal drugs or addictive medications other than what is currently prescribed.   11.AA, NA Sponsor are excellent resources for support    Keeping hands clean is one of the most important steps we can take to avoid getting sick and spreading germs to others.  Please wash your hands frequently.     Primary Provider:  Dr. Anastasia Jett  May 26, 2017 @ 8:00 am  [phone]  Please take this discharge folder with you to all your follow up appointments as it contains your lab results, diagnosis, medication list and discharge recommendations.         General Medication Instructions:   See your medication sheet(s) for instructions.   Take all medicines as directed.  Make no changes unless your doctor suggests them.   Go to all your doctor visits.  Be sure to have all your required lab tests. This way, your medicines can be refilled on time.  Do not use any drugs not prescribed by your provider.  AA/NA and Sponsors are excellent resources for support  Avoid alcohol.    Any follow up concerns:  Nursing questions call the Unit 3A-[facility] [phone]  Medical Record call [phone]  Outpatient Behavioral Intake call [phone]  LP+ Wait List/Bed Availability call [phone] ([place])    The entire treatment team has appreciated the opportunity to work with you Deric.  We wish you the best in the future and with your lifelong recovery goals. Please bring this discharge folder with you to all follow up appointments.  It contains your lab results, diagnosis, medication list and discharge recommendations.    THANK YOU FOR CHOOSING THE [facility]     The treatment team has appreciated the opportunity to work with you.  We wish you the best in the future.

## 2017-05-09 NOTE — DISCHARGE SUMMARY
Addiction Medicine Discharge Summary    Deric Greenwood MRN# [id number]   Age: 56 year old YOB: 1960     Date of Admission:  4/30/2017  Date of Discharge::  5/3/2017  1:02 PM  Admitting Physician:  Megha Nunes MD  Discharge Physician:  Megha Nunes MD     Primary care provider: [facility]          Admission Diagnoses:   Alcohol dependence with intoxication with complication (H) [F10.229]          Discharge Diagnosis:   Principal Problem:  Alcohol dependence with uncomplicated withdrawal (H), resolved without complication  Active Problems:  Alcohol use disorder, severe, dependence (H), initiating naltrexone  Hypertension, on medication, stable          Procedures:   All laboratory data reviewed  Results for orders placed or performed during the hospital encounter of 04/30/17   Drug abuse screen 6 urine (tox)   Result Value Ref Range    Amphetamine Qual Urine  NEG     Negative   Cutoff for a negative amphetamine is 500 ng/mL or less.      Barbiturates Qual Urine  NEG     Negative   Cutoff for a negative barbiturate is 200 ng/mL or less.      Benzodiazepine Qual Urine  NEG     Negative   Cutoff for a negative benzodiazepine is 200 ng/mL or less.      Cannabinoids Qual Urine  NEG     Negative   Cutoff for a negative cannabinoid is 50 ng/mL or less.      Cocaine Qual Urine  NEG     Negative   Cutoff for a negative cocaine is 300 ng/mL or less.      Ethanol Qual Urine (A) NEG     Positive   Cutoff for a positive urine ethanol is greater than 0.05 g/dL.  This is an   unconfirmed screening result to be used for medical purposes only.      Opiates Qualitative Urine  NEG     Negative   Cutoff for a negative opiate is 300 ng/mL or less.     Alcohol level blood   Result Value Ref Range    Ethanol g/dL 0.33 (HH) <0.01 g/dL   CBC with platelets differential   Result Value Ref Range    WBC 9.0 4.0 - 11.0 10e9/L    RBC Count 5.67 4.4 - 5.9 10e12/L    Hemoglobin 19.1 (H) 13.3 - 17.7  g/dL    Hematocrit 52.7 40.0 - 53.0 %    MCV 93 78 - 100 fl    MCH 33.7 (H) 26.5 - 33.0 pg    MCHC 36.2 31.5 - 36.5 g/dL    RDW 12.7 10.0 - 15.0 %    Platelet Count 219 150 - 450 10e9/L    Diff Method Automated Method     % Neutrophils 52.1 %    % Lymphocytes 36.1 %    % Monocytes 10.3 %    % Eosinophils 0.6 %    % Basophils 0.6 %    % Immature Granulocytes 0.3 %    Nucleated RBCs 0 0 /100    Absolute Neutrophil 4.7 1.6 - 8.3 10e9/L    Absolute Lymphocytes 3.3 0.8 - 5.3 10e9/L    Absolute Monocytes 0.9 0.0 - 1.3 10e9/L    Absolute Eosinophils 0.1 0.0 - 0.7 10e9/L    Absolute Basophils 0.1 0.0 - 0.2 10e9/L    Abs Immature Granulocytes 0.0 0 - 0.4 10e9/L    Absolute Nucleated RBC 0.0    Comprehensive metabolic panel   Result Value Ref Range    Sodium 140 133 - 144 mmol/L    Potassium 3.8 3.4 - 5.3 mmol/L    Chloride 101 94 - 109 mmol/L    Carbon Dioxide 29 20 - 32 mmol/L    Anion Gap 10 3 - 14 mmol/L    Glucose 98 70 - 99 mg/dL    Urea Nitrogen 8 7 - 30 mg/dL    Creatinine 0.72 0.66 - 1.25 mg/dL    GFR Estimate >90  Non  GFR Calc   >60 mL/min/1.7m2    GFR Estimate If Black >90   GFR Calc   >60 mL/min/1.7m2    Calcium 8.9 8.5 - 10.1 mg/dL    Bilirubin Total 0.5 0.2 - 1.3 mg/dL    Albumin 4.1 3.4 - 5.0 g/dL    Protein Total 8.1 6.8 - 8.8 g/dL    Alkaline Phosphatase 58 40 - 150 U/L     (H) 0 - 70 U/L     (H) 0 - 45 U/L   Lipase   Result Value Ref Range    Lipase 303 73 - 393 U/L   GGT   Result Value Ref Range    GGT 88 (H) 0 - 75 U/L   Alcohol breath test POCT   Result Value Ref Range    Alcohol Breath Test 0.33 (A) 0.00 - 0.01   Alcohol breath test POCT   Result Value Ref Range    Alcohol Breath Test 0.217 (A) 0.00 - 0.01   Transaminase elevation mild, appropriate to initiate naltrexone.         Medications Prior to Admission:     Prescriptions Prior to Admission   Medication Sig Dispense Refill Last Dose     omega-3 acid ethyl esters (LOVAZA) 1 G capsule Take 1 g by mouth  daily     4/30/2017     multivitamin, therapeutic with minerals (THERA-VIT-M) TABS Take 1 tablet by mouth daily 30 each 0 Past Week     LISINOPRIL PO Take 20 mg by mouth daily     4/30/2017     TRAZODONE HCL PO Take 25 mg by mouth nightly as needed for sleep     4/29/2017         [place]  query result:  Indicates no record, no prescriptions reported.          Discharge Medications:     Discharge Medication List as of 5/3/2017 12:48 PM      START taking these medications    Details   naltrexone (DEPADE;REVIA) 50 MG tablet Take 1 tablet (50 mg) by mouth daily, Disp-30 tablet, R-0, E-Prescribe         CONTINUE these medications which have NOT CHANGED    Details   omega-3 acid ethyl esters (LOVAZA) 1 G capsule Take 1 g by mouth daily, Historical      multivitamin, therapeutic with minerals (THERA-VIT-M) TABS Take 1 tablet by mouth daily, Disp-30 each, R-0, E-Prescribe      LISINOPRIL PO Take 20 mg by mouth daily, Historical      TRAZODONE HCL PO Take 25 mg by mouth nightly as needed for sleep, Historical                Consultations:   No consultations were requested during this admission          Brief History of Illness:   This patient is a 56 year old single/, employed male living alone, with chronic relapsing AUD course, who presented to our ED seeking detoxification services. Known to our unit from previous detoxification episodes, not followed by treatment. Patient works full time in tech industry, with at least monthly travel. Last detoxification episode was October 2016 under Dr. Galdamez. He was offered but declined treatment. He was sober for about 3 months. He notes that there is a lot of alcohol during work trips that he has no interest in, but manager had alcohol in office for recent celebration and that started this most recent relapse. He has been relapsed two daily drinking, two bottles of wine plus liquor, for past 2 months. He denies intercurrent injury or illness. He denies mood or anxiety  complaints. He is open to at least outpatient treatment discussion. We reviewed proposed risks, benefits, side effects of naltrexone, and he is open to trying this when OOD. He is admitted voluntarily. Current withdrawal complaints are tremulousness, flushed, sweats, anxiety.          Hospital Course:   Alcohol withdrawal monitored and managed via MSSA with diazepam. Detoxification course completed without complication. PTA antihypertensive medication continued during stay, with no symptomatic/urgent elevations.            Discharge Instructions and Follow-Up:   Discharge diet: Regular   Discharge activity: Activity as tolerated   Discharge follow-up: See AVS; naltrexone refills per PCP with appropriate hepatic monitoring           Discharge Disposition:   Discharged to home/self with plan for intake 5/15/2017 at [facility]      Attestation:  I have reviewed today's vital signs, notes, medications, and labs/studies pertinent to this admission.    Megha Nunes MD   Pediatrics/Addiction Medicine